# Patient Record
Sex: MALE | Race: ASIAN | NOT HISPANIC OR LATINO
[De-identification: names, ages, dates, MRNs, and addresses within clinical notes are randomized per-mention and may not be internally consistent; named-entity substitution may affect disease eponyms.]

---

## 2024-03-15 ENCOUNTER — NON-APPOINTMENT (OUTPATIENT)
Age: 84
End: 2024-03-15

## 2024-03-15 ENCOUNTER — APPOINTMENT (OUTPATIENT)
Dept: OPHTHALMOLOGY | Facility: CLINIC | Age: 84
End: 2024-03-15
Payer: MEDICARE

## 2024-03-15 PROCEDURE — 92136 OPHTHALMIC BIOMETRY: CPT

## 2024-03-15 PROCEDURE — 92004 COMPRE OPH EXAM NEW PT 1/>: CPT

## 2024-03-15 PROCEDURE — 92250 FUNDUS PHOTOGRAPHY W/I&R: CPT

## 2024-03-15 PROCEDURE — 92025 CPTRIZED CORNEAL TOPOGRAPHY: CPT

## 2024-05-29 NOTE — ASU PATIENT PROFILE, ADULT - FALL HARM RISK - UNIVERSAL INTERVENTIONS
Bed in lowest position, wheels locked, appropriate side rails in place/Call bell, personal items and telephone in reach/Instruct patient to call for assistance before getting out of bed or chair/Non-slip footwear when patient is out of bed/[place] to call system/Physically safe environment - no spills, clutter or unnecessary equipment/Purposeful Proactive Rounding/Room/bathroom lighting operational, light cord in reach

## 2024-05-29 NOTE — ASU PATIENT PROFILE, ADULT - NS PREOP UNDERSTANDS INFO
No solid food/dairy/candy/gum after 11:30pm tonight; water allowed before 04:30am tomorrow; patient reminded to come with photo ID/insurance/credit card; dress in comfortable clothes; no jewelries/contact lens/valuable; no smoking/alcohol drinking/recreational drug use today; escort to have photo ID; address and callback number was given./yes

## 2024-05-30 ENCOUNTER — APPOINTMENT (OUTPATIENT)
Dept: OPHTHALMOLOGY | Facility: AMBULATORY SURGERY CENTER | Age: 84
End: 2024-05-30

## 2024-05-30 ENCOUNTER — OUTPATIENT (OUTPATIENT)
Dept: OUTPATIENT SERVICES | Facility: HOSPITAL | Age: 84
LOS: 1 days | Discharge: HOME | End: 2024-05-30
Payer: MEDICARE

## 2024-05-30 ENCOUNTER — TRANSCRIPTION ENCOUNTER (OUTPATIENT)
Age: 84
End: 2024-05-30

## 2024-05-30 VITALS
SYSTOLIC BLOOD PRESSURE: 165 MMHG | HEIGHT: 66 IN | DIASTOLIC BLOOD PRESSURE: 65 MMHG | HEART RATE: 70 BPM | TEMPERATURE: 97 F | OXYGEN SATURATION: 97 % | RESPIRATION RATE: 15 BRPM | WEIGHT: 141.1 LBS

## 2024-05-30 VITALS
SYSTOLIC BLOOD PRESSURE: 142 MMHG | RESPIRATION RATE: 16 BRPM | OXYGEN SATURATION: 96 % | HEART RATE: 77 BPM | DIASTOLIC BLOOD PRESSURE: 61 MMHG | TEMPERATURE: 97 F

## 2024-05-30 DIAGNOSIS — Z98.890 OTHER SPECIFIED POSTPROCEDURAL STATES: Chronic | ICD-10-CM

## 2024-05-30 PROBLEM — I10 ESSENTIAL (PRIMARY) HYPERTENSION: Chronic | Status: ACTIVE | Noted: 2024-05-29

## 2024-05-30 PROBLEM — R73.03 PREDIABETES: Chronic | Status: ACTIVE | Noted: 2024-05-29

## 2024-05-30 PROCEDURE — 66984 XCAPSL CTRC RMVL W/O ECP: CPT | Mod: RT

## 2024-05-30 DEVICE — LENS IOL ACRYSOF NATURAL CLEAR 19.0 D
Type: IMPLANTABLE DEVICE | Site: RIGHT | Status: NON-FUNCTIONAL
Removed: 2024-05-30

## 2024-05-30 RX ORDER — ACETAMINOPHEN 500 MG
650 TABLET ORAL ONCE
Refills: 0 | Status: DISCONTINUED | OUTPATIENT
Start: 2024-05-30 | End: 2024-05-30

## 2024-05-30 RX ORDER — SODIUM CHLORIDE 9 MG/ML
1000 INJECTION, SOLUTION INTRAVENOUS
Refills: 0 | Status: DISCONTINUED | OUTPATIENT
Start: 2024-05-30 | End: 2024-05-30

## 2024-05-30 RX ORDER — TROPICAMIDE 1 %
1 DROPS OPHTHALMIC (EYE)
Refills: 0 | Status: COMPLETED | OUTPATIENT
Start: 2024-05-30 | End: 2024-05-30

## 2024-05-30 RX ORDER — PHENYLEPHRINE HCL 2.5 %
1 DROPS OPHTHALMIC (EYE)
Refills: 0 | Status: COMPLETED | OUTPATIENT
Start: 2024-05-30 | End: 2024-05-30

## 2024-05-30 RX ORDER — OFLOXACIN 0.3 %
1 DROPS OPHTHALMIC (EYE)
Refills: 0 | Status: COMPLETED | OUTPATIENT
Start: 2024-05-30 | End: 2024-05-30

## 2024-05-30 RX ADMIN — Medication 1 DROP(S): at 07:36

## 2024-05-30 RX ADMIN — Medication 1 DROP(S): at 07:29

## 2024-05-30 RX ADMIN — Medication 1 DROP(S): at 07:22

## 2024-05-30 RX ADMIN — Medication 1 DROP(S): at 07:21

## 2024-05-30 RX ADMIN — Medication 1 DROP(S): at 07:30

## 2024-05-30 NOTE — OPERATIVE REPORT - OPERATIVE RPOSRT DETAILS
SURGEON: Poncho Rivera MD    ASSISTANT: Rachell Hinojosa MD    PREOPERATIVE DIAGNOSIS: CATARACT, RIGHT EYE    POSTOPERATIVE DIAGNOSIS: CATARACT, RIGHT EYE    OPERATIVE PROCEDURE: PHACOEMULSIFICATION CATARACT EXTRACTION WITH POSTERIOR CHAMBER INTRAOCULAR LENS INSERTION, RIGHT EYE    LENS USED: SA60WF    LENS POWER: +19.00     PROCEDURE:	  The patient was brought into the operating room and placed supine on the operating room table. After uneventful induction of neuroleptic anesthesia, additional lidocaine gel was instilled into the operative eye achieving sufficient anesthesia. The patient was then prepped and draped in the usual sterile fashion. A wire lid speculum was then inserted into the operative eye giving a wide palpebral fissure.    A brennon knife was used to perform a paracentesis through clear cornea at the 10 o'clock limbal position. The anterior chamber was irrigated with lidocaine 1% non-preserved. Viscoelastic was then used to maintain the anterior chamber. A keratome was used to create a clear corneal incision just inside the temporal limbus. A cystotome was inserted through the main corneal wound and used to create an anterior lens capsular leaflet. Utrata forceps were then inserted through the main wound and used to create a continuous, curvilinear capsulorrhexis. Balanced salt solution was used to hydrodissect the nucleus. The phacoemulsification unit was then used to completely phacoemulsify the nucleus, following which an aspirating hand piece was used to aspirate all cortical remnants from the capsular bag. Viscoelastic was again used to reform the anterior chamber and capsular bag.    A new foldable posterior chamber intraocular lens was brought into the surgical field. It was folded and directly injected into the capsular bag following which it was centered and secure. Viscoelastic was removed with irrigation/aspiration. All wounds were hydrated and found to be watertight. The wounds remained watertight. An antibiotic/steroid mixture was irrigated into the conjunctival cul de sac. The lid speculum was removed from the eye and a shield placed over the eye. The patient tolerated the procedure well and went to the recovery area in good condition.

## 2024-05-30 NOTE — ASU DISCHARGE PLAN (ADULT/PEDIATRIC) - NS MD DC FALL RISK RISK
For information on Fall & Injury Prevention, visit: [email or web address] OR  [email or web address] OR  https://www.cdc.gov/steadi/patient.html

## 2024-05-30 NOTE — PACU DISCHARGE NOTE - NAUSEA/VOMITING:
None Please continue to advance your activity as tolerated, keeping yourself as active as possible, and continue with any strengthening exercises that you may tolerate.

## 2024-05-31 ENCOUNTER — APPOINTMENT (OUTPATIENT)
Dept: OPHTHALMOLOGY | Facility: CLINIC | Age: 84
End: 2024-05-31
Payer: MEDICARE

## 2024-05-31 ENCOUNTER — NON-APPOINTMENT (OUTPATIENT)
Age: 84
End: 2024-05-31

## 2024-05-31 PROCEDURE — 99024 POSTOP FOLLOW-UP VISIT: CPT

## 2024-06-05 RX ORDER — OFLOXACIN 0.3 %
1 DROPS OPHTHALMIC (EYE)
Refills: 0 | Status: COMPLETED | OUTPATIENT
Start: 2024-06-06 | End: 2024-06-06

## 2024-06-05 RX ORDER — TROPICAMIDE 1 %
1 DROPS OPHTHALMIC (EYE)
Refills: 0 | Status: COMPLETED | OUTPATIENT
Start: 2024-06-06 | End: 2024-06-06

## 2024-06-05 RX ORDER — SODIUM CHLORIDE 9 MG/ML
1000 INJECTION, SOLUTION INTRAVENOUS
Refills: 0 | Status: DISCONTINUED | OUTPATIENT
Start: 2024-06-06 | End: 2024-06-06

## 2024-06-05 RX ORDER — PHENYLEPHRINE HCL 2.5 %
1 DROPS OPHTHALMIC (EYE)
Refills: 0 | Status: COMPLETED | OUTPATIENT
Start: 2024-06-06 | End: 2024-06-06

## 2024-06-05 NOTE — ASU PATIENT PROFILE, ADULT - FALL HARM RISK - UNIVERSAL INTERVENTIONS
Bed in lowest position, wheels locked, appropriate side rails in place/Call bell, personal items and telephone in reach/Instruct patient to call for assistance before getting out of bed or chair/Non-slip footwear when patient is out of bed/[place] to call system/Physically safe environment - no spills, clutter or unnecessary equipment/Purposeful Proactive Rounding/Room/bathroom lighting operational, light cord in reach

## 2024-06-05 NOTE — ASU PATIENT PROFILE, ADULT - NSICDXPASTSURGICALHX_GEN_ALL_CORE_FT
PAST SURGICAL HISTORY:  H/O hernia repair      PAST SURGICAL HISTORY:  H/O colonoscopy     H/O hernia repair     S/P cataract surgery

## 2024-06-05 NOTE — ASU PATIENT PROFILE, ADULT - PATIENT/CAREGIVER ACCEPTED INTERPRETER SERVICES
Emergency Department  [address] [facility] [address]  Phone:  [phone]  Fax:  [phone]                                    Vipul Moreland   MRN: [id number]    Department:   Emergency Department   Date of Visit:  12/31/2018           After Visit Summary Signature Page    I have received my discharge instructions, and my questions have been answered. I have discussed any challenges I see with this plan with the nurse or doctor.    ..........................................................................................................................................  Patient/Patient Representative Signature      ..........................................................................................................................................  Patient Representative Print Name and Relationship to Patient    ..................................................               ................................................  Date                                   Time    ..........................................................................................................................................  Reviewed by Signature/Title    ...................................................              ..............................................  Date                                               Time          22EPIC Rev 08/18        yes

## 2024-06-05 NOTE — ASU PATIENT PROFILE, ADULT - NS PREOP UNDERSTANDS INFO
Light breakfast till 08:00 AM, clears allowed [id number] tomorrow; patient reminded to come with photo ID/insurance/credit card; dress in comfortable clothes; no jewelries/contact lens/valuable; no smoking/alcohol drinking/recreational drug use today; escort to have photo ID; address and callback number was given./yes

## 2024-06-06 ENCOUNTER — APPOINTMENT (OUTPATIENT)
Dept: OPHTHALMOLOGY | Facility: AMBULATORY SURGERY CENTER | Age: 84
End: 2024-06-06

## 2024-06-06 ENCOUNTER — OUTPATIENT (OUTPATIENT)
Dept: OUTPATIENT SERVICES | Facility: HOSPITAL | Age: 84
LOS: 1 days | Discharge: HOME | End: 2024-06-06
Payer: MEDICARE

## 2024-06-06 ENCOUNTER — TRANSCRIPTION ENCOUNTER (OUTPATIENT)
Age: 84
End: 2024-06-06

## 2024-06-06 VITALS
RESPIRATION RATE: 16 BRPM | DIASTOLIC BLOOD PRESSURE: 64 MMHG | HEART RATE: 85 BPM | TEMPERATURE: 98 F | OXYGEN SATURATION: 98 % | SYSTOLIC BLOOD PRESSURE: 144 MMHG

## 2024-06-06 VITALS
TEMPERATURE: 97 F | HEART RATE: 75 BPM | WEIGHT: 138.45 LBS | RESPIRATION RATE: 16 BRPM | DIASTOLIC BLOOD PRESSURE: 62 MMHG | OXYGEN SATURATION: 97 % | SYSTOLIC BLOOD PRESSURE: 134 MMHG | HEIGHT: 64 IN

## 2024-06-06 DIAGNOSIS — Z98.890 OTHER SPECIFIED POSTPROCEDURAL STATES: Chronic | ICD-10-CM

## 2024-06-06 DIAGNOSIS — Z98.49 CATARACT EXTRACTION STATUS, UNSPECIFIED EYE: Chronic | ICD-10-CM

## 2024-06-06 PROCEDURE — 66984 XCAPSL CTRC RMVL W/O ECP: CPT | Mod: LT,79

## 2024-06-06 DEVICE — LENS IOL ACRYSOF NAT CLR SA60WF 19.5D
Type: IMPLANTABLE DEVICE | Site: LEFT | Status: NON-FUNCTIONAL
Removed: 2024-06-06

## 2024-06-06 RX ORDER — LOSARTAN POTASSIUM 100 MG/1
1 TABLET, FILM COATED ORAL
Refills: 0 | DISCHARGE

## 2024-06-06 RX ORDER — FINASTERIDE 5 MG/1
1 TABLET, FILM COATED ORAL
Refills: 0 | DISCHARGE

## 2024-06-06 RX ORDER — AMLODIPINE BESYLATE 2.5 MG/1
1 TABLET ORAL
Refills: 0 | DISCHARGE

## 2024-06-06 RX ORDER — ACETAMINOPHEN 500 MG
650 TABLET ORAL ONCE
Refills: 0 | Status: DISCONTINUED | OUTPATIENT
Start: 2024-06-06 | End: 2024-06-06

## 2024-06-06 RX ADMIN — Medication 1 DROP(S): at 14:40

## 2024-06-06 RX ADMIN — Medication 1 DROP(S): at 14:35

## 2024-06-06 RX ADMIN — Medication 1 DROP(S): at 14:30

## 2024-06-06 NOTE — PRE-ANESTHESIA EVALUATION ADULT - NSPROPOSEDPROCEDFT_GEN_ALL_CORE
Report received from [facility]. Pt arrived from floor AAOx4. Maintenance dialysis initiated via RFA fistula without difficulty.   Cataract extraction left eye with IOL

## 2024-06-06 NOTE — OPERATIVE REPORT - OPERATIVE RPOSRT DETAILS
SURGEON: Poncho Rivera MD    ASSISTANT: Rachell Hinojosa MD    PREOPERATIVE DIAGNOSIS: CATARACT, LEFT EYE    POSTOPERATIVE DIAGNOSIS: CATARACT, LEFT EYE    OPERATIVE PROCEDURE: PHACOEMULSIFICATION CATARACT EXTRACTION WITH POSTERIOR CHAMBER INTRAOCULAR LENS INSERTION, LEFT EYE    LENS USED: SA60WF    LENS POWER: +19.50 D    PROCEDURE:	  The patient was brought into the operating room and placed supine on the operating room table. After uneventful induction of neuroleptic anesthesia, additional lidocaine gel was instilled into the operative eye achieving sufficient anesthesia. The patient was then prepped and draped in the usual sterile fashion. A wire lid speculum was then inserted into the operative eye giving a wide palpebral fissure.    A brennon knife was used to perform a paracentesis through clear cornea at the 5 o'clock limbal position. The anterior chamber was irrigated with lidocaine 1% non-preserved. Viscoelastic was then used to maintain the anterior chamber. A keratome was used to create a clear corneal incision just inside the temporal limbus. A cystotome was inserted through the main corneal wound and used to create an anterior lens capsular leaflet. Utrata forceps were then inserted through the main wound and used to create a continuous, curvilinear capsulorrhexis. Balanced salt solution was used to hydrodissect the nucleus. The phacoemulsification unit was then used to completely phacoemulsify the nucleus, following which an aspirating hand piece was used to aspirate all cortical remnants from the capsular bag. Viscoelastic was again used to reform the anterior chamber and capsular bag.    A new foldable posterior chamber intraocular lens was brought into the surgical field. It was folded and directly injected into the capsular bag following which it was centered and secure. Viscoelastic was removed with irrigation/aspiration. All wounds were hydrated and found to be watertight.  The wounds remained watertight. An antibiotic/steroid mixture was irrigated into the conjunctival cul de sac. The lid speculum was removed from the eye and a shield placed over the eye. The patient tolerated the procedure well and went to the recovery area in good condition

## 2024-06-06 NOTE — ASU DISCHARGE PLAN (ADULT/PEDIATRIC) - NS MD DC FALL RISK RISK
For information on Fall & Injury Prevention, visit: [email or web address] OR  [email or web address] OR  https://www.cdc.gov/steadi/patient.html

## 2024-06-07 ENCOUNTER — NON-APPOINTMENT (OUTPATIENT)
Age: 84
End: 2024-06-07

## 2024-06-07 ENCOUNTER — APPOINTMENT (OUTPATIENT)
Dept: OPHTHALMOLOGY | Facility: CLINIC | Age: 84
End: 2024-06-07
Payer: MEDICARE

## 2024-06-07 PROCEDURE — 99024 POSTOP FOLLOW-UP VISIT: CPT

## 2024-06-14 ENCOUNTER — NON-APPOINTMENT (OUTPATIENT)
Age: 84
End: 2024-06-14

## 2024-06-14 ENCOUNTER — APPOINTMENT (OUTPATIENT)
Dept: OPHTHALMOLOGY | Facility: CLINIC | Age: 84
End: 2024-06-14
Payer: MEDICARE

## 2024-06-14 PROCEDURE — 99024 POSTOP FOLLOW-UP VISIT: CPT

## 2024-07-16 ENCOUNTER — APPOINTMENT (OUTPATIENT)
Dept: OPHTHALMOLOGY | Facility: CLINIC | Age: 84
End: 2024-07-16
Payer: MEDICARE

## 2024-07-16 ENCOUNTER — NON-APPOINTMENT (OUTPATIENT)
Age: 84
End: 2024-07-16

## 2024-07-16 PROCEDURE — 99024 POSTOP FOLLOW-UP VISIT: CPT
